# Patient Record
Sex: FEMALE | Race: WHITE | NOT HISPANIC OR LATINO | URBAN - METROPOLITAN AREA
[De-identification: names, ages, dates, MRNs, and addresses within clinical notes are randomized per-mention and may not be internally consistent; named-entity substitution may affect disease eponyms.]

---

## 2020-11-10 ENCOUNTER — WEB ENCOUNTER (OUTPATIENT)
Dept: URBAN - METROPOLITAN AREA CLINIC 74 | Facility: CLINIC | Age: 80
End: 2020-11-10

## 2020-11-10 ENCOUNTER — OFFICE VISIT (OUTPATIENT)
Dept: URBAN - METROPOLITAN AREA CLINIC 74 | Facility: CLINIC | Age: 80
End: 2020-11-10
Payer: MEDICARE

## 2020-11-10 DIAGNOSIS — K59.09 OTHER CONSTIPATION: ICD-10-CM

## 2020-11-10 DIAGNOSIS — R19.4 CHANGE IN BOWEL HABIT: ICD-10-CM

## 2020-11-10 PROCEDURE — G8420 CALC BMI NORM PARAMETERS: HCPCS | Performed by: STUDENT IN AN ORGANIZED HEALTH CARE EDUCATION/TRAINING PROGRAM

## 2020-11-10 PROCEDURE — G8427 DOCREV CUR MEDS BY ELIG CLIN: HCPCS | Performed by: STUDENT IN AN ORGANIZED HEALTH CARE EDUCATION/TRAINING PROGRAM

## 2020-11-10 PROCEDURE — G9903 PT SCRN TBCO ID AS NON USER: HCPCS | Performed by: STUDENT IN AN ORGANIZED HEALTH CARE EDUCATION/TRAINING PROGRAM

## 2020-11-10 PROCEDURE — G8484 FLU IMMUNIZE NO ADMIN: HCPCS | Performed by: STUDENT IN AN ORGANIZED HEALTH CARE EDUCATION/TRAINING PROGRAM

## 2020-11-10 PROCEDURE — 99213 OFFICE O/P EST LOW 20 MIN: CPT | Performed by: STUDENT IN AN ORGANIZED HEALTH CARE EDUCATION/TRAINING PROGRAM

## 2020-11-10 RX ORDER — VENLAFAXINE HYDROCHLORIDE 37.5 MG/1
TAKE 1 CAPSULE (37.5 MG) BY ORAL ROUTE ONCE DAILY WITH FOOD CAPSULE, EXTENDED RELEASE ORAL 1
Qty: 0 | Refills: 0 | Status: DISCONTINUED | COMMUNITY
Start: 1900-01-01

## 2020-11-10 RX ORDER — POLYETHYLENE GLYCOL 3350, SODIUM SULFATE ANHYDROUS, SODIUM BICARBONATE, SODIUM CHLORIDE, POTASSIUM CHLORIDE 236; 22.74; 6.74; 5.86; 2.97 G/4L; G/4L; G/4L; G/4L; G/4L
AS DIRECTED POWDER, FOR SOLUTION ORAL ONCE
Qty: 1 BOTTLE | Refills: 0 | OUTPATIENT
Start: 2020-11-11 | End: 2020-11-12

## 2020-11-10 RX ORDER — TEMAZEPAM 7.5 MG/1
CAPSULE ORAL
Qty: 0 | Refills: 0 | Status: DISCONTINUED | COMMUNITY
Start: 1900-01-01

## 2020-11-10 RX ORDER — VENLAFAXINE HCL 37.5 MG
TAKE 1 CAPSULE (37.5 MG) BY ORAL ROUTE ONCE DAILY WITH FOOD CAPSULE, EXT RELEASE 24 HR ORAL 1
Qty: 0 | Refills: 0 | Status: DISCONTINUED | COMMUNITY
Start: 1900-01-01

## 2020-11-10 RX ORDER — TEMAZEPAM 15 MG/1
TAKE 1 CAPSULE (15 MG) BY ORAL ROUTE ONCE DAILY AT BEDTIME AS NEEDED CAPSULE ORAL 1
Qty: 0 | Refills: 0 | Status: ACTIVE | COMMUNITY
Start: 1900-01-01

## 2020-11-10 RX ORDER — ASPIRIN 325 MG/1
TABLET ORAL
Qty: 0 | Refills: 0 | Status: ACTIVE | COMMUNITY
Start: 1900-01-01

## 2020-11-10 RX ORDER — SODIUM, POTASSIUM,MAG SULFATES 17.5-3.13G
354 ML SOLUTION, RECONSTITUTED, ORAL ORAL
Qty: 1 | Refills: 0 | OUTPATIENT
Start: 2020-11-11

## 2020-11-10 RX ORDER — LORATADINE 10 MG
1 PACKET MIXED WITH 8 OUNCES OF FLUID TABLET,DISINTEGRATING ORAL BID
Qty: 60 | Refills: 2 | OUTPATIENT
Start: 2020-11-11

## 2020-11-10 RX ORDER — WARFARIN 2.5 MG/1
TAKE 1 TABLET (2.5 MG) BY ORAL ROUTE ONCE DAILY TABLET ORAL 1
Qty: 0 | Refills: 0 | Status: DISCONTINUED | COMMUNITY
Start: 1900-01-01

## 2020-11-10 NOTE — PHYSICAL EXAM HENT:
Head, normocephalic, atraumatic, Face, Face within normal limits, Ears, External ears within normal limits, Nose/Nasopharynx, External nose  normal appearance, nares patent, no nasal discharge, Mouth and Throat, Oral cavity appearance normal, Breath odor normal, Lips, Appearance normal yes [facility] permacath/yes

## 2020-11-10 NOTE — HPI-TODAY'S VISIT:
80-year-old female Patient comes in for evaluation of acute change in her bowel habits.  This happened about 3 to 4 months back.  At baseline patient used to move her bowels once or twice a day.  But in the last 4 months, she has been progressively more constipated.  Currently she is still able to move her bowels but now she has to manually disimpact herself frequently.  Patient has to strain a lot.  Stools are reported to be hard.  No blood in the stool.  Patient also reports perianal discomfort at the time of bowel movement.  But denies any abdomen pain.  No nausea or vomiting.  No reflux or dysphagia.  No heartburn. No new medications. Last colonoscopy about 10 years back which was notable for few polyps but unsure of size and nature.  No report is available to review. No family history of colorectal cancer. Anticoagulation includes aspirin 325 mg and Eliquis for A. fib. No family history of colorectal cancer Currently patient has not tried any particular laxatives.

## 2020-11-11 LAB
BASO (ABSOLUTE): 0.1
BASOS: 1
BUN/CREATININE RATIO: 12
BUN: 10
CARBON DIOXIDE, TOTAL: 29
CHLORIDE: 101
CREATININE: 0.86
EGFR IF AFRICN AM: 73
EGFR IF NONAFRICN AM: 64
EOS (ABSOLUTE): 0.1
EOS: 1
GLUCOSE: 94
HEMATOCRIT: 41.5
HEMATOLOGY COMMENTS:: (no result)
HEMOGLOBIN: 13.5
IMMATURE CELLS: (no result)
IMMATURE GRANS (ABS): 0
IMMATURE GRANULOCYTES: 0
LYMPHS (ABSOLUTE): 2.6
LYMPHS: 36
MCH: 30.8
MCHC: 32.5
MCV: 95
MONOCYTES(ABSOLUTE): 0.4
MONOCYTES: 6
NEUTROPHILS (ABSOLUTE): 4
NEUTROPHILS: 56
NRBC: (no result)
PLATELETS: 265
POTASSIUM: 4
RBC: 4.38
RDW: 12.6
SODIUM: 140
TSH: 1.34
WBC: 7.1

## 2020-11-23 ENCOUNTER — TELEPHONE ENCOUNTER (OUTPATIENT)
Dept: URBAN - METROPOLITAN AREA CLINIC 74 | Facility: CLINIC | Age: 80
End: 2020-11-23

## 2020-11-23 RX ORDER — LORATADINE 10 MG
1 PACKET MIXED WITH 8 OUNCES OF FLUID TABLET,DISINTEGRATING ORAL BID
Qty: 60 | Refills: 2 | Status: ACTIVE | COMMUNITY
Start: 2020-11-11

## 2020-11-23 RX ORDER — TEMAZEPAM 15 MG/1
TAKE 1 CAPSULE (15 MG) BY ORAL ROUTE ONCE DAILY AT BEDTIME AS NEEDED CAPSULE ORAL 1
Qty: 0 | Refills: 0 | Status: ACTIVE | COMMUNITY
Start: 1900-01-01

## 2020-11-23 RX ORDER — SODIUM, POTASSIUM,MAG SULFATES 17.5-3.13G
354 ML SOLUTION, RECONSTITUTED, ORAL ORAL
Qty: 354 ML | Refills: 0 | OUTPATIENT
Start: 2020-11-23

## 2020-11-23 RX ORDER — SODIUM, POTASSIUM,MAG SULFATES 17.5-3.13G
354 ML SOLUTION, RECONSTITUTED, ORAL ORAL
Qty: 1 | Refills: 0 | Status: ACTIVE | COMMUNITY
Start: 2020-11-11

## 2020-11-23 RX ORDER — ASPIRIN 325 MG/1
TABLET ORAL
Qty: 0 | Refills: 0 | Status: ACTIVE | COMMUNITY
Start: 1900-01-01

## 2020-12-10 ENCOUNTER — CLAIMS CREATED FROM THE CLAIM WINDOW (OUTPATIENT)
Dept: URBAN - METROPOLITAN AREA CLINIC 4 | Facility: CLINIC | Age: 80
End: 2020-12-10
Payer: MEDICARE

## 2020-12-10 ENCOUNTER — OFFICE VISIT (OUTPATIENT)
Dept: URBAN - METROPOLITAN AREA SURGERY CENTER 30 | Facility: SURGERY CENTER | Age: 80
End: 2020-12-10
Payer: MEDICARE

## 2020-12-10 DIAGNOSIS — K59.09 CHRONIC CONSTIPATION: ICD-10-CM

## 2020-12-10 DIAGNOSIS — K63.89 BACTERIAL OVERGROWTH SYNDROME: ICD-10-CM

## 2020-12-10 DIAGNOSIS — D12.4 BENIGN NEOPLASM OF DESCENDING COLON: ICD-10-CM

## 2020-12-10 PROCEDURE — G9937 DIG OR SURV COLSCO: HCPCS | Performed by: STUDENT IN AN ORGANIZED HEALTH CARE EDUCATION/TRAINING PROGRAM

## 2020-12-10 PROCEDURE — 88305 TISSUE EXAM BY PATHOLOGIST: CPT | Performed by: PATHOLOGY

## 2020-12-10 PROCEDURE — 45380 COLONOSCOPY AND BIOPSY: CPT | Performed by: STUDENT IN AN ORGANIZED HEALTH CARE EDUCATION/TRAINING PROGRAM

## 2020-12-10 PROCEDURE — G8907 PT DOC NO EVENTS ON DISCHARG: HCPCS | Performed by: STUDENT IN AN ORGANIZED HEALTH CARE EDUCATION/TRAINING PROGRAM

## 2020-12-22 ENCOUNTER — OFFICE VISIT (OUTPATIENT)
Dept: URBAN - METROPOLITAN AREA CLINIC 74 | Facility: CLINIC | Age: 80
End: 2020-12-22
Payer: MEDICARE

## 2020-12-22 DIAGNOSIS — K59.04 CHRONIC IDIOPATHIC CONSTIPATION: ICD-10-CM

## 2020-12-22 PROCEDURE — G9903 PT SCRN TBCO ID AS NON USER: HCPCS | Performed by: STUDENT IN AN ORGANIZED HEALTH CARE EDUCATION/TRAINING PROGRAM

## 2020-12-22 PROCEDURE — 99213 OFFICE O/P EST LOW 20 MIN: CPT | Performed by: STUDENT IN AN ORGANIZED HEALTH CARE EDUCATION/TRAINING PROGRAM

## 2020-12-22 PROCEDURE — G8484 FLU IMMUNIZE NO ADMIN: HCPCS | Performed by: STUDENT IN AN ORGANIZED HEALTH CARE EDUCATION/TRAINING PROGRAM

## 2020-12-22 PROCEDURE — G8427 DOCREV CUR MEDS BY ELIG CLIN: HCPCS | Performed by: STUDENT IN AN ORGANIZED HEALTH CARE EDUCATION/TRAINING PROGRAM

## 2020-12-22 PROCEDURE — G8420 CALC BMI NORM PARAMETERS: HCPCS | Performed by: STUDENT IN AN ORGANIZED HEALTH CARE EDUCATION/TRAINING PROGRAM

## 2020-12-22 RX ORDER — SODIUM, POTASSIUM,MAG SULFATES 17.5-3.13G
354 ML SOLUTION, RECONSTITUTED, ORAL ORAL
Qty: 1 | Refills: 0 | Status: DISCONTINUED | COMMUNITY
Start: 2020-11-11

## 2020-12-22 RX ORDER — TEMAZEPAM 15 MG/1
TAKE 1 CAPSULE (15 MG) BY ORAL ROUTE ONCE DAILY AT BEDTIME AS NEEDED CAPSULE ORAL 1
Qty: 0 | Refills: 0 | Status: ACTIVE | COMMUNITY
Start: 1900-01-01

## 2020-12-22 RX ORDER — SODIUM, POTASSIUM,MAG SULFATES 17.5-3.13G
354 ML SOLUTION, RECONSTITUTED, ORAL ORAL
Qty: 354 ML | Refills: 0 | Status: DISCONTINUED | COMMUNITY
Start: 2020-11-23

## 2020-12-22 RX ORDER — LORATADINE 10 MG
1 PACKET MIXED WITH 8 OUNCES OF FLUID TABLET,DISINTEGRATING ORAL BID
Qty: 60 | Refills: 2 | Status: ACTIVE | COMMUNITY
Start: 2020-11-11

## 2020-12-22 RX ORDER — ASPIRIN 325 MG/1
TABLET ORAL
Qty: 0 | Refills: 0 | Status: ACTIVE | COMMUNITY
Start: 1900-01-01

## 2020-12-22 NOTE — HPI-TODAY'S VISIT:
80-year-old female Patient is well established with me. Patient comes in for follow-up visit. Patient last colonoscopy with me which was recent was notable for 1 diminutive polyp in ascending colon which on biopsy was noted to be lymphoid aggregate.  Few diminutive polyps were noted in rectosigmoid colon which were not removed due to hyperplastic appearance.  Note was made of diverticulosis.  No other mass lesions or obstructive etiology. Patient since the time of procedure has been able to move her bowels on a regular basis.  Patient has been using MiraLAX on a daily basis.  Patient also has been increasing fiber in her diet.  Currently denies any abdominal pain.  No nausea or vomiting.  No diarrhea or blood in stool.  Good appetite and no unintentional weight loss. Patient is on aspirin and Eliquis.

## 2022-06-27 ENCOUNTER — OFFICE VISIT (OUTPATIENT)
Dept: URBAN - METROPOLITAN AREA CLINIC 74 | Facility: CLINIC | Age: 82
End: 2022-06-27
Payer: MEDICARE

## 2022-06-27 VITALS
WEIGHT: 138 LBS | TEMPERATURE: 97.7 F | SYSTOLIC BLOOD PRESSURE: 125 MMHG | BODY MASS INDEX: 21.66 KG/M2 | DIASTOLIC BLOOD PRESSURE: 70 MMHG | OXYGEN SATURATION: 95 % | HEART RATE: 85 BPM | HEIGHT: 67 IN

## 2022-06-27 DIAGNOSIS — K64.8 INTERNAL HEMORRHOIDS: ICD-10-CM

## 2022-06-27 DIAGNOSIS — R14.0 ABDOMINAL BLOATING: ICD-10-CM

## 2022-06-27 DIAGNOSIS — K57.30 COLON, DIVERTICULOSIS: ICD-10-CM

## 2022-06-27 DIAGNOSIS — K59.00 CONSTIPATION: ICD-10-CM

## 2022-06-27 DIAGNOSIS — K62.5 RECTAL BLEEDING: ICD-10-CM

## 2022-06-27 PROBLEM — 82934008: Status: ACTIVE | Noted: 2020-12-22

## 2022-06-27 PROCEDURE — 99213 OFFICE O/P EST LOW 20 MIN: CPT | Performed by: INTERNAL MEDICINE

## 2022-06-27 RX ORDER — ASPIRIN 325 MG/1
TABLET ORAL
Qty: 0 | Refills: 0 | Status: ACTIVE | COMMUNITY
Start: 1900-01-01

## 2022-06-27 RX ORDER — PSYLLIUM SEED (WITH DEXTROSE)
1 PACKET WITH 8 OUNCES OF LIQUID AS NEEDED POWDER (GRAM) ORAL ONCE A DAY
Qty: 30 | OUTPATIENT
Start: 2022-06-27 | End: 2022-07-27

## 2022-06-27 RX ORDER — DOCUSATE SODIUM 100 MG/1
1 CAPSULE AS NEEDED CAPSULE ORAL ONCE A DAY
Qty: 30 | OUTPATIENT
Start: 2022-06-27 | End: 2022-07-27

## 2022-06-27 RX ORDER — TEMAZEPAM 15 MG/1
TAKE 1 CAPSULE (15 MG) BY ORAL ROUTE ONCE DAILY AT BEDTIME AS NEEDED CAPSULE ORAL 1
Qty: 0 | Refills: 0 | Status: ACTIVE | COMMUNITY
Start: 1900-01-01

## 2022-06-27 RX ORDER — LORATADINE 10 MG
1 PACKET MIXED WITH 8 OUNCES OF FLUID TABLET,DISINTEGRATING ORAL BID
Qty: 60 | Refills: 2 | Status: ACTIVE | COMMUNITY
Start: 2020-11-11

## 2022-06-27 RX ORDER — HYDROCORTISONE ACETATE AND PRAMOXINE HYDROCHLORIDE 25; 10 MG/G; MG/G
1 APPLICATION CREAM TOPICAL THREE TIMES A DAY
Qty: 1 | Refills: 1 | OUTPATIENT
Start: 2022-06-27 | End: 2022-07-25

## 2022-06-27 NOTE — HPI-TODAY'S VISIT:
80-year-old female Patient is well established with me. Patient comes in for follow-up visit. Patient last colonoscopy with me which was recent was notable for 1 diminutive polyp in ascending colon which on biopsy was noted to be lymphoid aggregate.  Few diminutive polyps were noted in rectosigmoid colon which were not removed due to hyperplastic appearance.  Note was made of diverticulosis.  No other mass lesions or obstructive etiology. Patient since the time of procedure has been able to move her bowels on a regular basis.  Patient has been using MiraLAX on a daily basis.  Patient also has been increasing fiber in her diet.  Currently denies any abdominal pain.  No nausea or vomiting.  No diarrhea or blood in stool.  Good appetite and no unintentional weight loss. Patient is on aspirin and Eliquis.  Today June 27, 2022 the patient returns for a follow-up visit, the patient was last seen by Dr. Almonte on December 22, 2020 with chronic idiopathic constipation, at the time being treated with MiraLAX and high-fiber diet with a good response.  The patient had a previous colonoscopy which was negative for any polyps.  The patient at the time of colonoscopy was found to have colonic diverticulosis.  At the time of the last visit the patient appeared to be having regular bowel movements, was using MiraLAX on a daily basis and a high-fiber diet.  Patient denies having any abdominal pain, denies having any weight loss or any upper GI symptoms.  The patient was chronically anticoagulated on Eliquis Today the patient returns to the office stating that she has had some difficulty with constipation, although she defecates on a regular basis she has had to strain and has noticed a small amount of bright red blood on the toilet tissue as well as some perianal burning discomfort.  While straining she has palpated what appeared to be some skin tags.  In the past the patient was taking MiraLAX and fiber supplements and was defecating on a regular basis without any discomfort, she does note recall taking the medications on a regular basis.  The patient otherwise denied having any upper GI symptoms.  I discussed with the patient the previous colonoscopy.  The patient agreed to initiate therapy with Colace 100 mg daily, Metamucil 1 serving twice daily.  The patient will be treated with Analpram-HC 2.5% 3 times daily x14 days for hemorrhoidal discomfort.  The patient is due to have eye surgery tomorrow, she will return for a follow-up visit after she recovers from her eye surgery.

## 2022-07-13 ENCOUNTER — TELEPHONE ENCOUNTER (OUTPATIENT)
Dept: URBAN - METROPOLITAN AREA CLINIC 74 | Facility: CLINIC | Age: 82
End: 2022-07-13

## 2022-07-14 ENCOUNTER — TELEPHONE ENCOUNTER (OUTPATIENT)
Dept: URBAN - METROPOLITAN AREA CLINIC 74 | Facility: CLINIC | Age: 82
End: 2022-07-14

## 2022-07-20 ENCOUNTER — TELEPHONE ENCOUNTER (OUTPATIENT)
Dept: URBAN - METROPOLITAN AREA CLINIC 74 | Facility: CLINIC | Age: 82
End: 2022-07-20

## 2022-07-20 ENCOUNTER — OFFICE VISIT (OUTPATIENT)
Dept: URBAN - METROPOLITAN AREA CLINIC 74 | Facility: CLINIC | Age: 82
End: 2022-07-20
Payer: MEDICARE

## 2022-07-20 ENCOUNTER — DASHBOARD ENCOUNTERS (OUTPATIENT)
Age: 82
End: 2022-07-20

## 2022-07-20 ENCOUNTER — WEB ENCOUNTER (OUTPATIENT)
Dept: URBAN - METROPOLITAN AREA CLINIC 74 | Facility: CLINIC | Age: 82
End: 2022-07-20

## 2022-07-20 VITALS
SYSTOLIC BLOOD PRESSURE: 115 MMHG | OXYGEN SATURATION: 95 % | TEMPERATURE: 96.6 F | WEIGHT: 136 LBS | DIASTOLIC BLOOD PRESSURE: 80 MMHG | HEIGHT: 67 IN | HEART RATE: 92 BPM | BODY MASS INDEX: 21.35 KG/M2

## 2022-07-20 DIAGNOSIS — R14.0 ABDOMINAL BLOATING: ICD-10-CM

## 2022-07-20 DIAGNOSIS — K59.00 CONSTIPATION: ICD-10-CM

## 2022-07-20 DIAGNOSIS — K64.4 SKIN TAG OF ANUS: ICD-10-CM

## 2022-07-20 DIAGNOSIS — K64.8 INTERNAL HEMORRHOIDS: ICD-10-CM

## 2022-07-20 DIAGNOSIS — K57.30 COLON, DIVERTICULOSIS: ICD-10-CM

## 2022-07-20 DIAGNOSIS — K60.2 ANAL FISSURE: ICD-10-CM

## 2022-07-20 DIAGNOSIS — K64.5 THROMBOSED EXTERNAL HEMORRHOID: ICD-10-CM

## 2022-07-20 PROBLEM — 90458007: Status: ACTIVE | Noted: 2022-06-27

## 2022-07-20 PROCEDURE — 99213 OFFICE O/P EST LOW 20 MIN: CPT | Performed by: INTERNAL MEDICINE

## 2022-07-20 RX ORDER — ASPIRIN 325 MG/1
TABLET ORAL
Qty: 0 | Refills: 0 | Status: ACTIVE | COMMUNITY
Start: 1900-01-01

## 2022-07-20 RX ORDER — PSYLLIUM SEED (WITH DEXTROSE)
1 PACKET WITH 8 OUNCES OF LIQUID AS NEEDED POWDER (GRAM) ORAL ONCE A DAY
Qty: 30 | OUTPATIENT

## 2022-07-20 RX ORDER — PSYLLIUM SEED (WITH DEXTROSE)
1 PACKET WITH 8 OUNCES OF LIQUID AS NEEDED POWDER (GRAM) ORAL ONCE A DAY
Qty: 30 | Status: ACTIVE | COMMUNITY
Start: 2022-06-27 | End: 2022-07-27

## 2022-07-20 RX ORDER — HYDROCORTISONE ACETATE AND PRAMOXINE HYDROCHLORIDE 25; 10 MG/G; MG/G
1 APPLICATION CREAM TOPICAL THREE TIMES A DAY
Qty: 1 | Refills: 1 | Status: ACTIVE | COMMUNITY
Start: 2022-06-27 | End: 2022-07-25

## 2022-07-20 RX ORDER — TEMAZEPAM 15 MG/1
TAKE 1 CAPSULE (15 MG) BY ORAL ROUTE ONCE DAILY AT BEDTIME AS NEEDED CAPSULE ORAL 1
Qty: 0 | Refills: 0 | Status: ACTIVE | COMMUNITY
Start: 1900-01-01

## 2022-07-20 RX ORDER — DOCUSATE SODIUM 100 MG/1
1 CAPSULE AS NEEDED CAPSULE ORAL ONCE A DAY
Qty: 30 | OUTPATIENT

## 2022-07-20 RX ORDER — LORATADINE 10 MG
1 PACKET MIXED WITH 8 OUNCES OF FLUID TABLET,DISINTEGRATING ORAL BID
Qty: 60 | Refills: 2 | Status: ACTIVE | COMMUNITY
Start: 2020-11-11

## 2022-07-20 RX ORDER — DOCUSATE SODIUM 100 MG/1
1 CAPSULE AS NEEDED CAPSULE ORAL ONCE A DAY
Qty: 30 | Status: ON HOLD | COMMUNITY
Start: 2022-06-27 | End: 2022-07-27

## 2022-07-20 RX ORDER — HYDROCORTISONE ACETATE AND PRAMOXINE HYDROCHLORIDE 25; 10 MG/G; MG/G
1 APPLICATION CREAM TOPICAL THREE TIMES A DAY
Qty: 1 | Refills: 1 | OUTPATIENT

## 2022-07-20 NOTE — HPI-TODAY'S VISIT:
80-year-old female Patient is well established with me. Patient comes in for follow-up visit. Patient last colonoscopy with me which was recent was notable for 1 diminutive polyp in ascending colon which on biopsy was noted to be lymphoid aggregate.  Few diminutive polyps were noted in rectosigmoid colon which were not removed due to hyperplastic appearance.  Note was made of diverticulosis.  No other mass lesions or obstructive etiology. Patient since the time of procedure has been able to move her bowels on a regular basis.  Patient has been using MiraLAX on a daily basis.  Patient also has been increasing fiber in her diet.  Currently denies any abdominal pain.  No nausea or vomiting.  No diarrhea or blood in stool.  Good appetite and no unintentional weight loss. Patient is on aspirin and Eliquis.  Today June 27, 2022 the patient returns for a follow-up visit, the patient was last seen by Dr. Almonte on December 22, 2020 with chronic idiopathic constipation, at the time being treated with MiraLAX and high-fiber diet with a good response.  The patient had a previous colonoscopy which was negative for any polyps.  The patient at the time of colonoscopy was found to have colonic diverticulosis.  At the time of the last visit the patient appeared to be having regular bowel movements, was using MiraLAX on a daily basis and a high-fiber diet.  Patient denies having any abdominal pain, denies having any weight loss or any upper GI symptoms.  The patient was chronically anticoagulated on Eliquis Today the patient returns to the office stating that she has had some difficulty with constipation, although she defecates on a regular basis she has had to strain and has noticed a small amount of bright red blood on the toilet tissue as well as some perianal burning discomfort.  While straining she has palpated what appeared to be some skin tags.  In the past the patient was taking MiraLAX and fiber supplements and was defecating on a regular basis without any discomfort, she does note recall taking the medications on a regular basis.  The patient otherwise denied having any upper GI symptoms.  I discussed with the patient the previous colonoscopy.  The patient agreed to initiate therapy with Colace 100 mg daily, Metamucil 1 serving twice daily.  The patient will be treated with Analpram-HC 2.5% 3 times daily x14 days for hemorrhoidal discomfort.  The patient is due to have eye surgery tomorrow, she will return for a follow-up visit after she recovers from her eye surgery.  Today July 20, 2022 the patient returns for a follow-up visit, the patient was last seen in the office on June 27, 2022 with constipation, abdominal bloating, rectal bleeding, internal hemorrhoids and colonic diverticulosis at the time of the last visit the patient states that that she had some difficulty with constipation, patient was defecating on a regular basis but had to strain and have noticed a small amount of bright red blood on the toilet tissue, she had perianal burning discomfort after defecation.  The patient was able to palpate what appeared to be a skin tag.  In the past the patient took MiraLAX and fiber supplements and was able to defecate on a regular basis without any discomfort.  The patient denied having to use any perianal medication.  Other symptoms were negative.  We discussed the previous colonoscopy.  The patient agreed to initiate therapy with Colace 100 mg daily, Metamucil 1 serving twice daily, Analpram-HC 2.5% 3 times daily x14 days for hemorrhoidal discomfort.  The patient was due to have eye surgery and would return for a follow-up visit after she recovered from the eye surgery.  Today the patient returns to the office stating that she has developed intense anal pain.  Over the last few days she has noticed to pea-sized bumps that protruded through the anal canal, both extremely tender to touch, the pain was aggravated by defecation.  There was no rectal bleeding.  The patient has been taking Colace and Metamucil as instructed, she uses MiraLAX and is having regular formed bowel movements.  The patient states that walking and sitting is extremely painful.  On the perianal exam she has skin tags, she has a small pea-sized thrombosed hemorrhoid and might have a small anal fissure, I was unable to do a digital exam or an anoscopy due to the intensity of the discomfort.  The patient was advised to be seen by general surgery for possible surgical hemorrhoidal treatment as well as using an oral pram HC 2.5% 3 times daily.  The patient may need to be treated with Rectiv if a fissure is identified.  The patient will return for a follow-up visit after she gets treated by general surgery.

## 2022-08-01 ENCOUNTER — OFFICE VISIT (OUTPATIENT)
Dept: URBAN - METROPOLITAN AREA CLINIC 40 | Facility: CLINIC | Age: 82
End: 2022-08-01

## 2022-09-20 PROBLEM — 733657002: Status: ACTIVE | Noted: 2022-06-27

## 2022-09-22 ENCOUNTER — OFFICE VISIT (OUTPATIENT)
Dept: URBAN - METROPOLITAN AREA CLINIC 74 | Facility: CLINIC | Age: 82
End: 2022-09-22

## 2022-09-22 RX ORDER — PSYLLIUM SEED (WITH DEXTROSE)
1 PACKET WITH 8 OUNCES OF LIQUID AS NEEDED POWDER (GRAM) ORAL ONCE A DAY
Qty: 30 | OUTPATIENT

## 2022-09-22 RX ORDER — HYDROCORTISONE ACETATE AND PRAMOXINE HYDROCHLORIDE 25; 10 MG/G; MG/G
1 APPLICATION CREAM TOPICAL THREE TIMES A DAY
Qty: 1 | Refills: 1 | OUTPATIENT

## 2022-09-22 RX ORDER — DOCUSATE SODIUM 100 MG/1
1 CAPSULE AS NEEDED CAPSULE ORAL ONCE A DAY
Qty: 30 | OUTPATIENT

## 2022-09-22 NOTE — HPI-TODAY'S VISIT:
80-year-old female Patient is well established with me. Patient comes in for follow-up visit. Patient last colonoscopy with me which was recent was notable for 1 diminutive polyp in ascending colon which on biopsy was noted to be lymphoid aggregate.  Few diminutive polyps were noted in rectosigmoid colon which were not removed due to hyperplastic appearance.  Note was made of diverticulosis.  No other mass lesions or obstructive etiology. Patient since the time of procedure has been able to move her bowels on a regular basis.  Patient has been using MiraLAX on a daily basis.  Patient also has been increasing fiber in her diet.  Currently denies any abdominal pain.  No nausea or vomiting.  No diarrhea or blood in stool.  Good appetite and no unintentional weight loss. Patient is on aspirin and Eliquis.  Today June 27, 2022 the patient returns for a follow-up visit, the patient was last seen by Dr. Almonte on December 22, 2020 with chronic idiopathic constipation, at the time being treated with MiraLAX and high-fiber diet with a good response.  The patient had a previous colonoscopy which was negative for any polyps.  The patient at the time of colonoscopy was found to have colonic diverticulosis.  At the time of the last visit the patient appeared to be having regular bowel movements, was using MiraLAX on a daily basis and a high-fiber diet.  Patient denies having any abdominal pain, denies having any weight loss or any upper GI symptoms.  The patient was chronically anticoagulated on Eliquis Today the patient returns to the office stating that she has had some difficulty with constipation, although she defecates on a regular basis she has had to strain and has noticed a small amount of bright red blood on the toilet tissue as well as some perianal burning discomfort.  While straining she has palpated what appeared to be some skin tags.  In the past the patient was taking MiraLAX and fiber supplements and was defecating on a regular basis without any discomfort, she does note recall taking the medications on a regular basis.  The patient otherwise denied having any upper GI symptoms.  I discussed with the patient the previous colonoscopy.  The patient agreed to initiate therapy with Colace 100 mg daily, Metamucil 1 serving twice daily.  The patient will be treated with Analpram-HC 2.5% 3 times daily x14 days for hemorrhoidal discomfort.  The patient is due to have eye surgery tomorrow, she will return for a follow-up visit after she recovers from her eye surgery.  Today July 20, 2022 the patient returns for a follow-up visit, the patient was last seen in the office on June 27, 2022 with constipation, abdominal bloating, rectal bleeding, internal hemorrhoids and colonic diverticulosis at the time of the last visit the patient states that that she had some difficulty with constipation, patient was defecating on a regular basis but had to strain and have noticed a small amount of bright red blood on the toilet tissue, she had perianal burning discomfort after defecation.  The patient was able to palpate what appeared to be a skin tag.  In the past the patient took MiraLAX and fiber supplements and was able to defecate on a regular basis without any discomfort.  The patient denied having to use any perianal medication.  Other symptoms were negative.  We discussed the previous colonoscopy.  The patient agreed to initiate therapy with Colace 100 mg daily, Metamucil 1 serving twice daily, Analpram-HC 2.5% 3 times daily x14 days for hemorrhoidal discomfort.  The patient was due to have eye surgery and would return for a follow-up visit after she recovered from the eye surgery.  Today the patient returns to the office stating that she has developed intense anal pain.  Over the last few days she has noticed to pea-sized bumps that protruded through the anal canal, both extremely tender to touch, the pain was aggravated by defecation.  There was no rectal bleeding.  The patient has been taking Colace and Metamucil as instructed, she uses MiraLAX and is having regular formed bowel movements.  The patient states that walking and sitting is extremely painful.  On the perianal exam she has skin tags, she has a small pea-sized thrombosed hemorrhoid and might have a small anal fissure, I was unable to do a digital exam or an anoscopy due to the intensity of the discomfort.  The patient was advised to be seen by general surgery for possible surgical hemorrhoidal treatment as well as using an oral pram HC 2.5% 3 times daily.  The patient may need to be treated with Rectiv if a fissure is identified.  The patient will return for a follow-up visit after she gets treated by general surgery.  Today's September 22, 2022 the patient returns for a follow-up visit, the patient was last seen on July 20, 2022 with an anal fissure, thrombosed external hemorrhoids, skin tag, internal hemorrhoids, constipation, abdominal bloating and colonic diverticulosis.  At the time the patient complaint of intense anal pain, the patient also noticed that the size bump protruding through the anal canal which was extremely tender to touch, the pain was increased by defecation.  There was no rectal bleeding.  The patient has been taking Colace and Metamucil as instructed as well as MiraLAX on a regular basis having regular formed bowel movements.  The patient complained of intense pain while standing or sitting, the patient did have on anal exam skin tags, the patient had a small thrombosed hemorrhoid and possibly a small anal fissure.  The patient was advised to be seen by general surgery for possible hemorrhoidal treatment.  Will prescribe Analpram HC 2.5% 3 times daily.  We also discussed treatment with Rectiv.  The patient was advised to return for a follow-up visit after being evaluated by surgery.

## 2023-04-25 NOTE — PHYSICAL EXAM CHEST:
chest wall non-tender, breathing is unlabored without accessory muscle use, normal breath sounds Sunscreen Recommendations: Daily application, spf 30+ Detail Level: Detailed Detail Level: Generalized Sunscreen Recommendations: spf 30+ Moisturizer Recommendations: Amlactin Detail Level: Zone